# Patient Record
Sex: MALE | URBAN - METROPOLITAN AREA
[De-identification: names, ages, dates, MRNs, and addresses within clinical notes are randomized per-mention and may not be internally consistent; named-entity substitution may affect disease eponyms.]

---

## 2023-12-19 ENCOUNTER — APPOINTMENT (OUTPATIENT)
Dept: URBAN - METROPOLITAN AREA CLINIC 314 | Age: 38
Setting detail: DERMATOLOGY
End: 2024-01-03

## 2023-12-19 DIAGNOSIS — L30.9 DERMATITIS, UNSPECIFIED: ICD-10-CM

## 2023-12-19 PROCEDURE — 99203 OFFICE O/P NEW LOW 30 MIN: CPT

## 2023-12-19 PROCEDURE — OTHER PRESCRIPTION MEDICATION MANAGEMENT: OTHER

## 2023-12-19 PROCEDURE — OTHER PRESCRIPTION: OTHER

## 2023-12-19 PROCEDURE — OTHER MIPS QUALITY: OTHER

## 2023-12-19 PROCEDURE — OTHER COUNSELING: OTHER

## 2023-12-19 RX ORDER — TRIAMCINOLONE ACETONIDE 1 MG/G
CREAM TOPICAL BID
Qty: 30 | Refills: 1 | Status: ERX | COMMUNITY
Start: 2023-12-19

## 2023-12-19 ASSESSMENT — LOCATION DETAILED DESCRIPTION DERM
LOCATION DETAILED: RIGHT CENTRAL LATERAL NECK
LOCATION DETAILED: LEFT SUPERIOR POSTERIOR NECK

## 2023-12-19 ASSESSMENT — LOCATION ZONE DERM: LOCATION ZONE: NECK

## 2023-12-19 ASSESSMENT — LOCATION SIMPLE DESCRIPTION DERM: LOCATION SIMPLE: NECK

## 2023-12-19 NOTE — PROCEDURE: PRESCRIPTION MEDICATION MANAGEMENT
Initiate Treatment: Patient has triamcinolone at home, will begin to apply to neck twice daily for 2 weeks\\n\\ntriamcinolone acetonide 0.1 % topical cream BID\\nRBSE reviewed
Plan: Discussed to plan starting hydroquinone at f/u\\nSuspect LPP. D/w PT. Discussed consideration of biopsy if progressive, for consideration of PO
Detail Level: Zone
Render In Strict Bullet Format?: No

## 2024-01-29 ENCOUNTER — APPOINTMENT (OUTPATIENT)
Dept: URBAN - METROPOLITAN AREA CLINIC 314 | Age: 39
Setting detail: DERMATOLOGY
End: 2024-01-29

## 2024-01-29 ENCOUNTER — RX ONLY (RX ONLY)
Age: 39
End: 2024-01-29

## 2024-01-29 DIAGNOSIS — L30.9 DERMATITIS, UNSPECIFIED: ICD-10-CM

## 2024-01-29 PROCEDURE — OTHER ADDITIONAL NOTES: OTHER

## 2024-01-29 PROCEDURE — OTHER PRESCRIPTION MEDICATION MANAGEMENT: OTHER

## 2024-01-29 PROCEDURE — OTHER COUNSELING: OTHER

## 2024-01-29 PROCEDURE — 99214 OFFICE O/P EST MOD 30 MIN: CPT

## 2024-01-29 RX ORDER — MOMETASONE FUROATE 1 MG/G
OINTMENT TOPICAL
Qty: 45 | Refills: 1 | Status: ERX | COMMUNITY
Start: 2024-01-29

## 2024-01-29 ASSESSMENT — LOCATION DETAILED DESCRIPTION DERM
LOCATION DETAILED: LEFT SUPERIOR POSTERIOR NECK
LOCATION DETAILED: RIGHT CENTRAL LATERAL NECK

## 2024-01-29 ASSESSMENT — LOCATION SIMPLE DESCRIPTION DERM: LOCATION SIMPLE: NECK

## 2024-01-29 ASSESSMENT — LOCATION ZONE DERM: LOCATION ZONE: NECK

## 2024-01-29 NOTE — PROCEDURE: ADDITIONAL NOTES
Render Risk Assessment In Note?: no
Detail Level: Simple
Additional Notes: Resolving, less pigmentation, borders less defined at time of visit. Photos updated in patients chart. Unlikely use of Mometasone or Triamcinolone would worsen condition. Discussed with pt switching from Triamcinolone cream to Mometasone ointment.\\nPt denies new medications or personal care products [discussed ddx Riels/pigmented contact/phytophoto since very well demarcated], does get monthly massages and is unsure of products used and states products contain fragrances

## 2024-01-29 NOTE — PROCEDURE: PRESCRIPTION MEDICATION MANAGEMENT
Discontinue Regimen: triamcinolone acetonide 0.1 % topical cream BID (reports possible irritation)
Initiate Treatment: Mometasone BID x 2 weeks \\nRBSE reviewed
Plan: Per photos, stable to improved s/p TCS at . Recommended continue unless irritating.\\nSuspect LPP. D/w PT. Discussed consideration of biopsy if progressive, for consideration of PO. Prefers to hold off. If stable/not progressive, consider hydroquinone
Detail Level: Zone
Render In Strict Bullet Format?: No